# Patient Record
Sex: MALE | Race: WHITE | NOT HISPANIC OR LATINO | Employment: STUDENT | URBAN - NONMETROPOLITAN AREA
[De-identification: names, ages, dates, MRNs, and addresses within clinical notes are randomized per-mention and may not be internally consistent; named-entity substitution may affect disease eponyms.]

---
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
## 2021-04-29 ENCOUNTER — OFFICE VISIT (OUTPATIENT)
Dept: CARDIOLOGY | Facility: CLINIC | Age: 21
End: 2021-04-29

## 2021-04-29 VITALS
HEART RATE: 76 BPM | DIASTOLIC BLOOD PRESSURE: 85 MMHG | SYSTOLIC BLOOD PRESSURE: 144 MMHG | OXYGEN SATURATION: 100 % | BODY MASS INDEX: 28.64 KG/M2 | WEIGHT: 189 LBS | HEIGHT: 68 IN

## 2021-04-29 DIAGNOSIS — R00.0 TACHYCARDIA: ICD-10-CM

## 2021-04-29 DIAGNOSIS — R06.02 SHORTNESS OF BREATH: ICD-10-CM

## 2021-04-29 DIAGNOSIS — R00.2 PALPITATIONS: Primary | ICD-10-CM

## 2021-04-29 PROCEDURE — 99204 OFFICE O/P NEW MOD 45 MIN: CPT | Performed by: PHYSICIAN ASSISTANT

## 2021-04-29 RX ORDER — HYDROCHLOROTHIAZIDE 25 MG/1
25 TABLET ORAL 2 TIMES DAILY
COMMUNITY
End: 2021-05-03

## 2021-04-29 NOTE — PROGRESS NOTES
Subjective   Artem Santoro is a 20 y.o. male     Chief Complaint   Patient presents with   • Establish Care   • Palpitations   • Rapid Heart Rate     mostly at night        HPI  The patient presents back to the clinic today to reestablish care.  He was last seen over 3 years ago in the setting of shortness of air and symptoms otherwise.  Work-up was recommended for which she never had at that time.  He was lost to follow-up and presents today to reestablish care after being evaluated to the emergency room.  The patient was evaluated through the emergency room mainly because of tachycardia and palpitations, but also because of mild chest discomfort and dyspnea as well.  His evaluation at that time including cardiac enzymes, EKG findings, etc. all were benign.  He was discharged for follow-up and advised to follow-up in the clinic today.  The patient reports that most of his symptoms occur at night.  He will have onset of a very fast forceful heartbeat.  This can last varied amounts of times.  He experiences no dizziness or syncope at that time.  He does report increasing dyspnea at that time.  He feels that this is likely most related to anxiety, as symptoms typically occur when he misses his evening dose of hydroxyzine.  Symptoms do appear to be intensifying when he does experience those.  He also notes episodes of chest discomfort at times, typically only when working out and described clearly as chest wall discomfort.  He has no failure symptoms.  He has no further complaints.      Current Outpatient Medications   Medication Sig Dispense Refill   • hydroCHLOROthiazide (HYDRODIURIL) 25 MG tablet Take 25 mg by mouth 2 (two) times a day.       No current facility-administered medications for this visit.       Patient has no known allergies.    Past Medical History:   Diagnosis Date   • Anxiety    • Hypertension    • Nosebleed    • Seizure (CMS/[facility])        Social History     Socioeconomic History   • Marital status:  "Single     Spouse name: Not on file   • Number of children: Not on file   • Years of education: Not on file   • Highest education level: Not on file   Tobacco Use   • Smoking status: Never Smoker   • Smokeless tobacco: Never Used   Substance and Sexual Activity   • Alcohol use: No   • Drug use: No       Family History   Problem Relation Age of Onset   • Hypertension Mother    • Hepatitis Mother    • Diabetes Father    • Hypertension Father    • No Known Problems Brother    • Heart attack Maternal Aunt    • Coronary artery disease Maternal Aunt    • Heart attack Maternal Uncle    • Coronary artery disease Maternal Uncle    • Heart attack Maternal Grandmother    • Coronary artery disease Maternal Grandmother        Review of Systems   Constitutional: Positive for chills (at night ). Negative for fatigue and fever.   HENT: Negative for congestion, rhinorrhea and sneezing.    Eyes: Negative.  Negative for visual disturbance.   Respiratory: Negative for chest tightness, shortness of breath and wheezing.    Cardiovascular: Positive for chest pain and palpitations. Negative for leg swelling.   Gastrointestinal: Negative.    Endocrine: Negative.  Negative for cold intolerance.   Genitourinary: Negative.    Musculoskeletal: Negative.  Negative for arthralgias, back pain and neck pain.   Skin: Negative.  Negative for rash and wound.   Allergic/Immunologic: Negative.  Negative for environmental allergies.   Neurological: Positive for headaches. Negative for dizziness, weakness and numbness.   Hematological: Negative.  Does not bruise/bleed easily.   Psychiatric/Behavioral: Positive for sleep disturbance (falling asleep ).       Objective     Vitals:    04/29/21 1410   BP: 144/85   BP Location: Left arm   Patient Position: Sitting   Pulse: 76   SpO2: 100%   Weight: 85.7 kg (189 lb)   Height: 172.7 cm (68\")        /85 (BP Location: Left arm, Patient Position: Sitting)   Pulse 76   Ht 172.7 cm (68\")   Wt 85.7 kg (189 lb) "   SpO2 100%   BMI 28.74 kg/m²      Lab Results (most recent)     None          Physical Exam  Vitals and nursing note reviewed.   Constitutional:       General: He is not in acute distress.     Appearance: He is well-developed.   HENT:      Head: Normocephalic and atraumatic.   Eyes:      Conjunctiva/sclera: Conjunctivae normal.      Pupils: Pupils are equal, round, and reactive to light.   Neck:      Vascular: No JVD.      Trachea: No tracheal deviation.   Cardiovascular:      Rate and Rhythm: Normal rate and regular rhythm.      Heart sounds: Normal heart sounds.   Pulmonary:      Effort: Pulmonary effort is normal.      Breath sounds: Normal breath sounds.   Abdominal:      General: Bowel sounds are normal. There is no distension.      Palpations: Abdomen is soft. There is no mass.      Tenderness: There is no abdominal tenderness. There is no guarding or rebound.   Musculoskeletal:         General: No tenderness or deformity. Normal range of motion.      Cervical back: Normal range of motion and neck supple.   Skin:     General: Skin is warm and dry.      Coloration: Skin is not pale.      Findings: No erythema or rash.   Neurological:      Mental Status: He is alert and oriented to person, place, and time.   Psychiatric:         Behavior: Behavior normal.         Thought Content: Thought content normal.         Judgment: Judgment normal.         Procedure   Procedures         Assessment/Plan      Diagnosis Plan   1. Palpitations  Adult Transthoracic Echo Complete W/ Cont if Necessary Per Protocol    Holter Monitor - 24 Hour   2. Tachycardia  Adult Transthoracic Echo Complete W/ Cont if Necessary Per Protocol    Holter Monitor - 24 Hour   3. Shortness of breath  Adult Transthoracic Echo Complete W/ Cont if Necessary Per Protocol    Holter Monitor - 24 Hour     1.  I would like to schedule for Holter monitor given recurrent episodes of tachycardia and palpitations.  We can evaluate for dysrhythmic substrates  given current description of symptoms.    2.  I will also schedule for an echocardiogram to evaluate LV size and function, valvular morphologies, etc.    3.  At this time, the patient appears to be on appropriate medications.  I do not feel suppressive therapy is warranted at this time for his palpitations/tachycardia.  The patient feels that symptoms are directly related to anxiety, of note.    4.  I will to see him back after results of the above studies are available.  We will recommend him further at that time.  He will call for any issues prior to follow-up.           Artem Probbs  reports that he has never smoked. He has never used smokeless tobacco.          Patient's Body mass index is 28.74 kg/m². BMI is above normal parameters. Recommendations include: educational material.           Electronically signed by:

## 2021-04-29 NOTE — PATIENT INSTRUCTIONS

## 2021-05-03 ENCOUNTER — TELEPHONE (OUTPATIENT)
Dept: CARDIOLOGY | Facility: CLINIC | Age: 21
End: 2021-05-03

## 2021-05-03 RX ORDER — HYDROXYZINE HYDROCHLORIDE 25 MG/1
25 TABLET, FILM COATED ORAL AS NEEDED
COMMUNITY
End: 2021-09-21

## 2021-05-03 NOTE — TELEPHONE ENCOUNTER
Patient called stating the medication listed is not correct. He is not taking HCTZ he is taking hydroxyzine. Med list updated. Ella Marie MA

## 2021-06-01 ENCOUNTER — TELEPHONE (OUTPATIENT)
Dept: CARDIOLOGY | Facility: CLINIC | Age: 21
End: 2021-06-01

## 2021-06-01 NOTE — TELEPHONE ENCOUNTER
Pt LVM stating to add his mother to release.       I called pt, advised him that he will need to update his verbal form for us to speak w/ his mother regularly.   He stated he's still not doing well and that he'd like to get an rx to slow his heart down. Stated he was at [facility].   I scheduled pt for an appt tomorrow due to continued sx, he is aware that he needs to bring all pill bottles to appt.

## 2021-06-01 NOTE — TELEPHONE ENCOUNTER
"Pt's mother LVM stating pt has been having \"heart issues\" and went to ER, HR was 180. Stated they want holter results and for him to be seen Thursday when he comes in for his testing.   #[phone]        Holter shows max HR of 129, nothing critical or serious was seen.   Openings w/ JR and NB tomorrow.  (NO RELEASE TO SPEAK W/MOTHER!)      First attempt to reach pt. Left a voicemail for pt to return my call at [phone], opt 2.   With return call, please find out what ER pt was seen at and offer him one of the openings tomorrow w/ JR or NB. Thank you!           "

## 2021-06-02 ENCOUNTER — OFFICE VISIT (OUTPATIENT)
Dept: CARDIOLOGY | Facility: CLINIC | Age: 21
End: 2021-06-02

## 2021-06-02 VITALS
DIASTOLIC BLOOD PRESSURE: 92 MMHG | HEART RATE: 90 BPM | HEIGHT: 68 IN | OXYGEN SATURATION: 99 % | BODY MASS INDEX: 27.58 KG/M2 | WEIGHT: 182 LBS | SYSTOLIC BLOOD PRESSURE: 146 MMHG

## 2021-06-02 DIAGNOSIS — I49.49 PREMATURE BEATS: ICD-10-CM

## 2021-06-02 DIAGNOSIS — R00.2 PALPITATIONS: Primary | ICD-10-CM

## 2021-06-02 DIAGNOSIS — R00.0 TACHYCARDIA: ICD-10-CM

## 2021-06-02 PROCEDURE — 99213 OFFICE O/P EST LOW 20 MIN: CPT | Performed by: PHYSICIAN ASSISTANT

## 2021-06-02 RX ORDER — MULTIPLE VITAMINS W/ MINERALS TAB 9MG-400MCG
1 TAB ORAL DAILY
COMMUNITY

## 2021-06-02 NOTE — PROGRESS NOTES
Problem list     Subjective   Artem Santoro is a 20 y.o. male     Chief Complaint   Patient presents with   • Hospital Follow Up Visit     [facility]. Rcds in chart   • Rapid Heart Rate       HPI    Patient is a 20-year-old female who presents to the office for evaluation.  He was seen last office visit in regards to palpitations.  Patient was scheduled for event monitor.  Event monitor demonstrated predominantly sinus rhythm with sinus arrhythmia.  Patient had occasional PACs and PVCs.  Occasional tachycardia    Patient went to the hospital because of fluttering and palpitations.  Work-up there was benign.  He is here today to follow-up.  He experiences these mainly at night.  He would notice a fluttering sensation that occurs.  He does not describe any chest pain or shortness of breath.  His main complaint is this fluttering sensation that occurs.  He does not describe PND orthopnea.    He does not describe any presyncope or syncope..  Otherwise appears stable      Current Outpatient Medications on File Prior to Visit   Medication Sig Dispense Refill   • hydrOXYzine (ATARAX) 25 MG tablet Take 25 mg by mouth As Needed.     • multivitamin with minerals (MULTIVITAMIN ADULT PO) Take 1 tablet by mouth Daily.       No current facility-administered medications on file prior to visit.       Sulfa antibiotics    Past Medical History:   Diagnosis Date   • Anxiety    • Hypertension    • Nosebleed    • Seizure (CMS/HCC)    • Tachycardia        Social History     Socioeconomic History   • Marital status: Single     Spouse name: Not on file   • Number of children: Not on file   • Years of education: Not on file   • Highest education level: Not on file   Tobacco Use   • Smoking status: Never Smoker   • Smokeless tobacco: Never Used   Substance and Sexual Activity   • Alcohol use: No   • Drug use: No       Family History   Problem Relation Age of Onset   • Hypertension Mother    • Hepatitis Mother    • Diabetes Father    •  "Hypertension Father    • No Known Problems Brother    • Heart attack Maternal Aunt    • Coronary artery disease Maternal Aunt    • Heart attack Maternal Uncle    • Coronary artery disease Maternal Uncle    • Heart attack Maternal Grandmother    • Coronary artery disease Maternal Grandmother        Review of Systems   Constitutional: Negative.  Negative for chills, fatigue and fever.   HENT: Negative.  Negative for congestion, sinus pressure and sore throat.    Eyes: Negative.  Negative for visual disturbance.   Respiratory: Negative for chest tightness and shortness of breath.    Cardiovascular: Positive for palpitations (races). Negative for chest pain and leg swelling.   Gastrointestinal: Negative.  Negative for abdominal pain, blood in stool, constipation, diarrhea, nausea and vomiting.   Endocrine: Positive for cold intolerance (when heart rate elevated at times).   Genitourinary: Negative.  Negative for dysuria, frequency, hematuria and urgency.   Musculoskeletal: Positive for back pain (left shoulder comes and goes x one month). Negative for neck pain.   Skin: Negative.  Negative for rash.   Allergic/Immunologic: Negative.  Negative for environmental allergies and food allergies.   Neurological: Positive for light-headedness (with elevated heart rate). Negative for dizziness and syncope.   Hematological: Negative.  Does not bruise/bleed easily.   Psychiatric/Behavioral: Negative.  Negative for sleep disturbance (denies waking with soa or cp).       Objective   Vitals:    06/02/21 0845   BP: 146/92   BP Location: Left arm   Patient Position: Sitting   Pulse: 90   SpO2: 99%   Weight: 82.6 kg (182 lb)   Height: 172.7 cm (68\")      /92 (BP Location: Left arm, Patient Position: Sitting)   Pulse 90   Ht 172.7 cm (68\")   Wt 82.6 kg (182 lb)   SpO2 99%   BMI 27.67 kg/m²     Lab Results (most recent)     None          Physical Exam  Vitals and nursing note reviewed.   Constitutional:       General: He is not " in acute distress.     Appearance: Normal appearance. He is well-developed.   HENT:      Head: Normocephalic and atraumatic.   Eyes:      General: No scleral icterus.        Right eye: No discharge.         Left eye: No discharge.      Conjunctiva/sclera: Conjunctivae normal.   Neck:      Vascular: No carotid bruit.   Cardiovascular:      Rate and Rhythm: Normal rate and regular rhythm.      Heart sounds: Normal heart sounds. No murmur heard.   No friction rub. No gallop.    Pulmonary:      Effort: Pulmonary effort is normal. No respiratory distress.      Breath sounds: Normal breath sounds. No wheezing or rales.   Chest:      Chest wall: No tenderness.   Musculoskeletal:      Right lower leg: No edema.      Left lower leg: No edema.   Skin:     General: Skin is warm and dry.      Coloration: Skin is not pale.      Findings: No erythema or rash.   Neurological:      Mental Status: He is alert and oriented to person, place, and time.      Cranial Nerves: No cranial nerve deficit.   Psychiatric:         Behavior: Behavior normal.         Procedure   Procedures       Assessment/Plan     Problems Addressed this Visit        Cardiac and Vasculature    Premature beats    Relevant Medications    metoprolol tartrate (LOPRESSOR) 25 MG tablet    Tachycardia    Palpitations - Primary      Diagnoses       Codes Comments    Palpitations    -  Primary ICD-10-CM: R00.2  ICD-9-CM: 785.1     Tachycardia     ICD-10-CM: R00.0  ICD-9-CM: 785.0     Premature beats     ICD-10-CM: I49.49  ICD-9-CM: 427.60         Recommendation  1.  Patient with palpitations and tachycardia on event monitor with occasional premature beats.  I would like to start him on low-dose beta-blocker therapy.  We discussed lifestyle modification such as decreasing caffeine consumption etc.  For now, we will start on medication and he is to call in 1 week in regards to symptoms    2.  Otherwise is doing well.  We will see him back for follow-up in approximately 3 to  4 months.  Follow-up with primary as scheduled                  Electronically signed by:

## 2021-06-02 NOTE — PATIENT INSTRUCTIONS
"BMI for Adults  What is BMI?  Body mass index (BMI) is a number that is calculated from a person's weight and height. BMI can help estimate how much of a person's weight is composed of fat. BMI does not measure body fat directly. Rather, it is an alternative to procedures that directly measure body fat, which can be difficult and expensive.  BMI can help identify people who may be at higher risk for certain medical problems.  What are BMI measurements used for?  BMI is used as a screening tool to identify possible weight problems. It helps determine whether a person is obese, overweight, a healthy weight, or underweight.  BMI is useful for:  · Identifying a weight problem that may be related to a medical condition or may increase the risk for medical problems.  · Promoting changes, such as changes in diet and exercise, to help reach a healthy weight. BMI screening can be repeated to see if these changes are working.  How is BMI calculated?  BMI involves measuring your weight in relation to your height. Both height and weight are measured, and the BMI is calculated from those numbers. This can be done either in English ([place] or metric measurements. Note that charts and online BMI calculators are available to help you find your BMI quickly and easily without having to do these calculations yourself.  To calculate your BMI in English ([place] measurements:    1. Measure your weight in pounds (lb).  2. Multiply the number of pounds by 703.  ? For example, for a person who weighs 180 lb, multiply that number by 703, which equals 126,540.  3. Measure your height in inches. Then multiply that number by itself to get a measurement called \"inches squared.\"  ? For example, for a person who is 70 inches tall, the \"inches squared\" measurement is 70 inches x 70 inches, which equals 4,900 inches squared.  4. Divide the total from step 2 (number of lb x 703) by the total from step 3 (inches squared): 126,540 ÷ 4,900 = 25.8. This is " "your BMI.  To calculate your BMI in metric measurements:  1. Measure your weight in kilograms (kg).  2. Measure your height in meters (m). Then multiply that number by itself to get a measurement called \"meters squared.\"  ? For example, for a person who is 1.75 m tall, the \"meters squared\" measurement is 1.75 m x 1.75 m, which is equal to 3.1 meters squared.  3. Divide the number of kilograms (your weight) by the meters squared number. In this example: 70 ÷ 3.1 = 22.6. This is your BMI.  What do the results mean?  BMI charts are used to identify whether you are underweight, normal weight, overweight, or obese. The following guidelines will be used:  · Underweight: BMI less than 18.5.  · Normal weight: BMI between 18.5 and 24.9.  · Overweight: BMI between 25 and 29.9.  · Obese: BMI of 30 or above.  Keep these notes in mind:  · Weight includes both fat and muscle, so someone with a muscular build, such as an athlete, may have a BMI that is higher than 24.9. In cases like these, BMI is not an accurate measure of body fat.  · To determine if excess body fat is the cause of a BMI of 25 or higher, further assessments may need to be done by a health care provider.  · BMI is usually interpreted in the same way for men and women.  Where to find more information  For more information about BMI, including tools to quickly calculate your BMI, go to these websites:  · Centers for Disease Control and Prevention: www.cdc.gov  · American Heart Association: www.heart.org  · National Heart, Lung, and Blood [place]: www.nhlbi.nih.gov  Summary  · Body mass index (BMI) is a number that is calculated from a person's weight and height.  · BMI may help estimate how much of a person's weight is composed of fat. BMI can help identify those who may be at higher risk for certain medical problems.  · BMI can be measured using English measurements or metric measurements.  · BMI charts are used to identify whether you are underweight, normal " weight, overweight, or obese.  This information is not intended to replace advice given to you by your health care provider. Make sure you discuss any questions you have with your health care provider.  Document Revised: 09/09/2020 Document Reviewed: 07/17/2020  Elsevier Patient Education © 2021 [employer]

## 2021-06-03 ENCOUNTER — HOSPITAL ENCOUNTER (OUTPATIENT)
Dept: CARDIOLOGY | Facility: HOSPITAL | Age: 21
Discharge: HOME | End: 2021-06-03
Admitting: PHYSICIAN ASSISTANT

## 2021-06-03 DIAGNOSIS — R00.2 PALPITATIONS: ICD-10-CM

## 2021-06-03 DIAGNOSIS — R06.02 SHORTNESS OF BREATH: ICD-10-CM

## 2021-06-03 DIAGNOSIS — R00.0 TACHYCARDIA: ICD-10-CM

## 2021-06-03 PROCEDURE — 93306 TTE W/DOPPLER COMPLETE: CPT | Performed by: INTERNAL MEDICINE

## 2021-06-03 PROCEDURE — 93306 TTE W/DOPPLER COMPLETE: CPT

## 2021-06-12 LAB
BH CV ECHO MEAS - ACS: 2.5 CM
BH CV ECHO MEAS - AO MAX PG: 8.4 MMHG
BH CV ECHO MEAS - AO MEAN PG: 5 MMHG
BH CV ECHO MEAS - AO ROOT AREA (BSA CORRECTED): 1.6
BH CV ECHO MEAS - AO ROOT AREA: 7.8 CM^2
BH CV ECHO MEAS - AO ROOT DIAM: 3.2 CM
BH CV ECHO MEAS - AO V2 MAX: 145 CM/SEC
BH CV ECHO MEAS - AO V2 MEAN: 104 CM/SEC
BH CV ECHO MEAS - AO V2 VTI: 25.5 CM
BH CV ECHO MEAS - BSA(HAYCOCK): 2 M^2
BH CV ECHO MEAS - BSA: 2 M^2
BH CV ECHO MEAS - BZI_BMI: 27.7 KILOGRAMS/M^2
BH CV ECHO MEAS - BZI_METRIC_HEIGHT: 172.7 CM
BH CV ECHO MEAS - BZI_METRIC_WEIGHT: 82.6 KG
BH CV ECHO MEAS - EDV(CUBED): 49 ML
BH CV ECHO MEAS - EDV(MOD-SP4): 81.2 ML
BH CV ECHO MEAS - EDV(TEICH): 56.6 ML
BH CV ECHO MEAS - EF(CUBED): 46 %
BH CV ECHO MEAS - EF(MOD-SP4): 52 %
BH CV ECHO MEAS - EF(TEICH): 39.2 %
BH CV ECHO MEAS - ESV(CUBED): 26.5 ML
BH CV ECHO MEAS - ESV(MOD-SP4): 39 ML
BH CV ECHO MEAS - ESV(TEICH): 34.4 ML
BH CV ECHO MEAS - FS: 18.6 %
BH CV ECHO MEAS - IVS/LVPW: 0.9
BH CV ECHO MEAS - IVSD: 0.96 CM
BH CV ECHO MEAS - LA DIMENSION: 3.4 CM
BH CV ECHO MEAS - LA/AO: 1.1
BH CV ECHO MEAS - LV DIASTOLIC VOL/BSA (35-75): 41.4 ML/M^2
BH CV ECHO MEAS - LV IVRT: 0.11 SEC
BH CV ECHO MEAS - LV MASS(C)D: 112.1 GRAMS
BH CV ECHO MEAS - LV MASS(C)DI: 57.1 GRAMS/M^2
BH CV ECHO MEAS - LV SYSTOLIC VOL/BSA (12-30): 19.9 ML/M^2
BH CV ECHO MEAS - LVIDD: 3.7 CM
BH CV ECHO MEAS - LVIDS: 3 CM
BH CV ECHO MEAS - LVLD AP4: 7.8 CM
BH CV ECHO MEAS - LVLS AP4: 5.6 CM
BH CV ECHO MEAS - LVOT AREA (M): 3.1 CM^2
BH CV ECHO MEAS - LVOT AREA: 3.1 CM^2
BH CV ECHO MEAS - LVOT DIAM: 2 CM
BH CV ECHO MEAS - LVPWD: 1.1 CM
BH CV ECHO MEAS - MV A MAX VEL: 78 CM/SEC
BH CV ECHO MEAS - MV DEC SLOPE: 231 CM/SEC^2
BH CV ECHO MEAS - MV E MAX VEL: 78.4 CM/SEC
BH CV ECHO MEAS - MV E/A: 1
BH CV ECHO MEAS - RVDD: 3.5 CM
BH CV ECHO MEAS - SI(AO): 101.2 ML/M^2
BH CV ECHO MEAS - SI(CUBED): 11.5 ML/M^2
BH CV ECHO MEAS - SI(MOD-SP4): 21.5 ML/M^2
BH CV ECHO MEAS - SI(TEICH): 11.3 ML/M^2
BH CV ECHO MEAS - SV(AO): 198.7 ML
BH CV ECHO MEAS - SV(CUBED): 22.6 ML
BH CV ECHO MEAS - SV(MOD-SP4): 42.2 ML
BH CV ECHO MEAS - SV(TEICH): 22.2 ML

## 2021-06-14 ENCOUNTER — TELEPHONE (OUTPATIENT)
Dept: CARDIOLOGY | Facility: CLINIC | Age: 21
End: 2021-06-14

## 2021-06-14 NOTE — TELEPHONE ENCOUNTER
Patient informed of echo results and reminded of follow up appointment. Patient verbalized understanding. Gilda Roberts LPN    ----- Message from WICHO Cramer sent at 6/14/2021  9:24 AM EDT -----  Please advise patient.     Adult Transthoracic Echo Complete W/ Cont if Necessary Per Protocol  Order: [id number]  Status:  Final result   Visible to patient:  No (not released) Dx:  Palpitations; Shortness of breath; Ta...  Details    Reading Physician Reading Date Result Priority   Adi Gallegos MD  [phone] 6/3/2021 Routine      Result Text  1.  Normal LV size, function, wall motion, wall thickness.  Usually estimated ejection fraction of 55±5% is corroborated by 3D EF of 57%.  Normal LV diastolic function and filling pressures.  The atria and right ventricle are normal.  No septal defect or intracavitary mass or thrombus.     2.  Valves are morphologically and physiologically normal.     3.  No pericardial or great vessel pathology.     4.  Pulmonary pressures cannot be calculated.

## 2021-06-16 ENCOUNTER — APPOINTMENT (OUTPATIENT)
Dept: CARDIOLOGY | Facility: HOSPITAL | Age: 21
End: 2021-06-16

## 2021-07-07 ENCOUNTER — APPOINTMENT (OUTPATIENT)
Dept: CARDIOLOGY | Facility: HOSPITAL | Age: 21
End: 2021-07-07

## 2021-07-12 DIAGNOSIS — R06.02 SHORTNESS OF BREATH: ICD-10-CM

## 2021-07-12 DIAGNOSIS — R00.2 PALPITATIONS: Primary | ICD-10-CM

## 2021-07-12 DIAGNOSIS — R00.0 TACHYCARDIA: ICD-10-CM

## 2021-07-12 NOTE — PROGRESS NOTES
Patient called office stated he would like to wear heart monitor, stated continuing to have palpitations. Per Kenneth LICEA monitor ordered for two weeks. Gilda Roberts LPN

## 2021-09-21 ENCOUNTER — OFFICE VISIT (OUTPATIENT)
Dept: CARDIOLOGY | Facility: CLINIC | Age: 21
End: 2021-09-21

## 2021-09-21 VITALS
OXYGEN SATURATION: 99 % | BODY MASS INDEX: 26.55 KG/M2 | SYSTOLIC BLOOD PRESSURE: 117 MMHG | DIASTOLIC BLOOD PRESSURE: 75 MMHG | HEART RATE: 63 BPM | HEIGHT: 68 IN | WEIGHT: 175.2 LBS

## 2021-09-21 DIAGNOSIS — R06.02 SHORTNESS OF BREATH: ICD-10-CM

## 2021-09-21 DIAGNOSIS — Z00.00 ROUTINE ADULT HEALTH MAINTENANCE: ICD-10-CM

## 2021-09-21 DIAGNOSIS — R00.2 PALPITATIONS: Primary | ICD-10-CM

## 2021-09-21 DIAGNOSIS — R00.0 TACHYCARDIA: ICD-10-CM

## 2021-09-21 PROCEDURE — 99213 OFFICE O/P EST LOW 20 MIN: CPT | Performed by: PHYSICIAN ASSISTANT

## 2021-09-21 RX ORDER — DIAZEPAM 5 MG/1
5 TABLET ORAL DAILY
COMMUNITY

## 2021-09-21 RX ORDER — SERTRALINE HYDROCHLORIDE 100 MG/1
100 TABLET, FILM COATED ORAL DAILY
COMMUNITY

## 2021-09-21 NOTE — PROGRESS NOTES
Problem list     Subjective   Artem Santoro is a 20 y.o. male     Chief Complaint   Patient presents with   • Follow-up     3 month / testing follow up        HPI  The patient presents back to the clinic today for routine follow-up.  We have followed her because of recurrent episodes of palpitations and tachycardia.  Previous event monitor was largely benign.  Holter indicated some PACs and PVCs.  He had sinus tachycardia which seem to correlate the most with the patient's symptoms.  He eventually was started on metoprolol and followed long-term.  The patient had a previous echo which was benign, of note.  Since starting metoprolol, the patient overall has felt much improved.  Recently, he started noticing increasing palpitations and tachycardia.  He feels that this could be related to hypokalemia which has been an issue for him historically when getting laboratory evaluation.  He has had no dizziness or syncope.  He does have dyspnea.  He has no further complaints.    Current Outpatient Medications on File Prior to Visit   Medication Sig Dispense Refill   • diazePAM (VALIUM) 5 MG tablet Take 5 mg by mouth Daily. 1/2 tablet     • metoprolol tartrate (LOPRESSOR) 25 MG tablet Take 1 tablet by mouth 2 (Two) Times a Day. 60 tablet 11   • multivitamin with minerals (MULTIVITAMIN ADULT PO) Take 1 tablet by mouth Daily.     • sertraline (ZOLOFT) 100 MG tablet Take 100 mg by mouth Daily.     • [DISCONTINUED] hydrOXYzine (ATARAX) 25 MG tablet Take 25 mg by mouth As Needed.       No current facility-administered medications on file prior to visit.       Sulfa antibiotics    Past Medical History:   Diagnosis Date   • Anxiety    • Hypertension    • Nosebleed    • Seizure (CMS/HCC)    • Tachycardia        Social History     Socioeconomic History   • Marital status: Single     Spouse name: Not on file   • Number of children: Not on file   • Years of education: Not on file   • Highest education level: Not on file   Tobacco Use   •  "Smoking status: Never Smoker   • Smokeless tobacco: Never Used   Substance and Sexual Activity   • Alcohol use: No   • Drug use: No       Family History   Problem Relation Age of Onset   • Hypertension Mother    • Hepatitis Mother    • Diabetes Father    • Hypertension Father    • No Known Problems Brother    • Heart attack Maternal Aunt    • Coronary artery disease Maternal Aunt    • Heart attack Maternal Uncle    • Coronary artery disease Maternal Uncle    • Heart attack Maternal Grandmother    • Coronary artery disease Maternal Grandmother        Review of Systems   Constitutional: Negative.  Negative for chills, fatigue and fever.   HENT: Negative.  Negative for congestion, rhinorrhea and sore throat.    Eyes: Negative.  Negative for visual disturbance.   Respiratory: Negative.  Negative for chest tightness, shortness of breath and wheezing.    Cardiovascular: Positive for palpitations. Negative for chest pain and leg swelling.   Gastrointestinal: Negative.    Endocrine: Negative.    Genitourinary: Negative.    Musculoskeletal: Negative for arthralgias, back pain and neck pain.   Skin: Negative.  Negative for rash and wound.   Allergic/Immunologic: Negative.  Negative for environmental allergies.   Neurological: Negative.  Negative for dizziness, weakness, numbness and headaches.   Hematological: Negative.  Does not bruise/bleed easily.   Psychiatric/Behavioral: Positive for sleep disturbance (falling asleep ).       Objective   Vitals:    09/21/21 1416   BP: 117/75   BP Location: Left arm   Patient Position: Sitting   Pulse: 63   SpO2: 99%   Weight: 79.5 kg (175 lb 3.2 oz)   Height: 172.7 cm (68\")      /75 (BP Location: Left arm, Patient Position: Sitting)   Pulse 63   Ht 172.7 cm (68\")   Wt 79.5 kg (175 lb 3.2 oz)   SpO2 99%   BMI 26.64 kg/m²    Lab Results (most recent)     None        Physical Exam  Vitals and nursing note reviewed.   Constitutional:       General: He is not in acute distress.     " Appearance: He is well-developed.   HENT:      Head: Normocephalic and atraumatic.   Eyes:      Conjunctiva/sclera: Conjunctivae normal.      Pupils: Pupils are equal, round, and reactive to light.   Neck:      Vascular: No JVD.      Trachea: No tracheal deviation.   Cardiovascular:      Rate and Rhythm: Normal rate and regular rhythm.      Heart sounds: Normal heart sounds.   Pulmonary:      Effort: Pulmonary effort is normal.      Breath sounds: Normal breath sounds.   Abdominal:      General: Bowel sounds are normal. There is no distension.      Palpations: Abdomen is soft. There is no mass.      Tenderness: There is no abdominal tenderness. There is no guarding or rebound.   Musculoskeletal:         General: No tenderness or deformity. Normal range of motion.      Cervical back: Normal range of motion and neck supple.   Skin:     General: Skin is warm and dry.      Coloration: Skin is not pale.      Findings: No erythema or rash.   Neurological:      Mental Status: He is alert and oriented to person, place, and time.   Psychiatric:         Behavior: Behavior normal.         Thought Content: Thought content normal.         Judgment: Judgment normal.           Procedure   Procedures       Assessment/Plan      Diagnosis Plan   1. Palpitations  CBC & Differential    Comprehensive Metabolic Panel    Lipid Panel    TSH   2. Tachycardia  CBC & Differential    Comprehensive Metabolic Panel    Lipid Panel    TSH   3. Shortness of breath  CBC & Differential    Comprehensive Metabolic Panel    Lipid Panel    TSH   4. Routine adult health maintenance  CBC & Differential    Comprehensive Metabolic Panel    Lipid Panel    TSH     1.  The patient reports recurrent palpitations as of recent.  He feels that this could be related to hypokalemia by his report.  I will schedule for routine laboratories to evaluate further.  As we are drawing laboratories, we will schedule for all his routine laboratories at that time as well.    2.   Mostly, he has felt much improved on metoprolol and anxiolytic medications.  His palpitations/tachycardic symptoms have minimized on that therapy.  I will continue that without change.    3.  If laboratories are unremarkable, I would make no further adjustments.  If symptoms of palpitations or tachycardia minimized, the patient will call to us immediately.  We could always consider antidysrhythmic therapy.  I would hold on that unless symptoms become significantly worse.                   Electronically signed by:

## 2021-09-21 NOTE — PATIENT INSTRUCTIONS

## 2021-10-04 ENCOUNTER — TELEPHONE (OUTPATIENT)
Dept: CARDIOLOGY | Facility: CLINIC | Age: 21
End: 2021-10-04

## 2021-10-04 NOTE — TELEPHONE ENCOUNTER
----- Message from Gilda Roberts LPN sent at 10/4/2021  4:51 PM EDT -----    ----- Message -----  From: aJron Dotson PA  Sent: 10/4/2021   3:26 PM EDT  To: Gilda Roberts LPN    Labs are okay

## 2022-07-14 ENCOUNTER — TELEPHONE (OUTPATIENT)
Dept: CARDIOLOGY | Facility: CLINIC | Age: 22
End: 2022-07-14

## 2022-07-14 DIAGNOSIS — I49.49 PREMATURE BEATS: Primary | ICD-10-CM

## 2022-07-14 DIAGNOSIS — R00.0 TACHYCARDIA: ICD-10-CM

## 2022-07-14 DIAGNOSIS — R00.2 PALPITATIONS: ICD-10-CM

## 2023-06-12 DIAGNOSIS — R00.0 TACHYCARDIA: ICD-10-CM

## 2023-06-12 DIAGNOSIS — I49.49 PREMATURE BEATS: ICD-10-CM

## 2023-06-12 DIAGNOSIS — R00.2 PALPITATIONS: ICD-10-CM

## 2023-10-10 DIAGNOSIS — R00.0 TACHYCARDIA: ICD-10-CM

## 2023-10-10 DIAGNOSIS — R00.2 PALPITATIONS: ICD-10-CM

## 2023-10-10 DIAGNOSIS — I49.49 PREMATURE BEATS: ICD-10-CM

## 2023-11-09 DIAGNOSIS — R00.0 TACHYCARDIA: ICD-10-CM

## 2023-11-09 DIAGNOSIS — R00.2 PALPITATIONS: ICD-10-CM

## 2023-11-09 DIAGNOSIS — I49.49 PREMATURE BEATS: ICD-10-CM

## 2024-03-29 DIAGNOSIS — R00.0 TACHYCARDIA: ICD-10-CM

## 2024-03-29 DIAGNOSIS — I49.49 PREMATURE BEATS: ICD-10-CM

## 2024-03-29 DIAGNOSIS — R00.2 PALPITATIONS: ICD-10-CM

## 2024-04-01 DIAGNOSIS — I49.49 PREMATURE BEATS: ICD-10-CM

## 2024-04-01 DIAGNOSIS — R00.2 PALPITATIONS: ICD-10-CM

## 2024-04-01 DIAGNOSIS — R00.0 TACHYCARDIA: ICD-10-CM

## 2024-04-01 NOTE — TELEPHONE ENCOUNTER
Caller: Artem Santoro    Relationship: Self    Best call back number: [phone]    Requested Prescriptions:   Requested Prescriptions     Pending Prescriptions Disp Refills    metoprolol tartrate (LOPRESSOR) 25 MG tablet 60 tablet 0     Sig: Take 1 tablet by mouth 2 (Two) Times a Day.        Pharmacy where request should be sent: [id number] - [facility], [place] - [address] AT [address] [facility] [address] - [phone]  - [phone] FX     Last office visit with prescribing clinician: Visit date not found   Last telemedicine visit with prescribing clinician: Visit date not found   Next office visit with prescribing clinician: Visit date not found     Additional details provided by patient: PATIENT HAS 1 PILL    Does the patient have less than a 3 day supply:  [x] Yes  [] No    Would you like a call back once the refill request has been completed: [] Yes [x] No    If the office needs to give you a call back, can they leave a voicemail: [] Yes [x] No    Anika Alves Rep   04/01/24 10:37 EDT

## 2024-04-04 DIAGNOSIS — R00.0 TACHYCARDIA: ICD-10-CM

## 2024-04-04 DIAGNOSIS — I49.49 PREMATURE BEATS: ICD-10-CM

## 2024-04-04 DIAGNOSIS — R00.2 PALPITATIONS: ICD-10-CM

## 2024-04-04 NOTE — TELEPHONE ENCOUNTER
Pt called stating he needs refills but hasn't been seen in a few years. I advised pt that I could Rf until seen, but must keep appt.

## 2024-05-07 ENCOUNTER — OFFICE VISIT (OUTPATIENT)
Dept: CARDIOLOGY | Facility: CLINIC | Age: 24
End: 2024-05-07
Payer: COMMERCIAL

## 2024-05-07 VITALS
HEIGHT: 68 IN | BODY MASS INDEX: 30.95 KG/M2 | DIASTOLIC BLOOD PRESSURE: 88 MMHG | RESPIRATION RATE: 18 BRPM | HEART RATE: 77 BPM | WEIGHT: 204.2 LBS | OXYGEN SATURATION: 98 % | SYSTOLIC BLOOD PRESSURE: 144 MMHG

## 2024-05-07 DIAGNOSIS — R00.2 PALPITATIONS: Primary | ICD-10-CM

## 2024-05-07 DIAGNOSIS — R00.0 TACHYCARDIA: ICD-10-CM

## 2024-05-07 PROCEDURE — 93000 ELECTROCARDIOGRAM COMPLETE: CPT | Performed by: CLINICAL NURSE SPECIALIST

## 2024-05-07 PROCEDURE — 99213 OFFICE O/P EST LOW 20 MIN: CPT | Performed by: CLINICAL NURSE SPECIALIST

## 2024-05-10 ENCOUNTER — TELEPHONE (OUTPATIENT)
Dept: CARDIOLOGY | Facility: CLINIC | Age: 24
End: 2024-05-10
Payer: COMMERCIAL

## 2024-05-10 DIAGNOSIS — E78.5 HYPERLIPIDEMIA LDL GOAL <100: Primary | ICD-10-CM

## 2024-05-10 NOTE — TELEPHONE ENCOUNTER
Brigette Martin, WICHO Silva, Azeb  Labs ok except .  Recommend diet modifications to reduce cholesterol and increasing exercise.  We can give him diet education at his follow up.  Recheck lipid panel in 6 months

## 2024-05-10 NOTE — TELEPHONE ENCOUNTER
First attempt to reach pt. Left a voicemail for pt to return my call at [phone].       RELAY      Labs looked good other than elevated LDL cholesterol. Try to reduce cholesterol in diet and increase exercise. We will recheck labs in 6 months.

## 2024-11-27 ENCOUNTER — TELEPHONE (OUTPATIENT)
Dept: CARDIOLOGY | Facility: CLINIC | Age: 24
End: 2024-11-27
Payer: COMMERCIAL

## 2024-11-27 NOTE — TELEPHONE ENCOUNTER
Name: Artem Santoro      Relationship: Self      Best Callback Number: [phone]       HUB PROVIDED THE RELAY MESSAGE FROM THE OFFICE      PATIENT: HAS FURTHER QUESTIONS AND WOULD LIKE A CALL BACK AT THE FOLLOWING PHONE NUMBER     ADDITIONAL INFORMATION: CAN YOU FAX ORDERS TO [facility] IN [place], [place]?

## 2025-01-15 ENCOUNTER — TELEPHONE (OUTPATIENT)
Dept: CARDIOLOGY | Facility: CLINIC | Age: 25
End: 2025-01-15
Payer: COMMERCIAL

## 2025-01-15 NOTE — TELEPHONE ENCOUNTER
Working on overdue results, pt is past due for labs as we have not received results or it has not been performed.     Per chart review, pt was contacted regd this overdue result previously and stated he would get labs w/ Dr. Carrillo's office.   I called Dr. Carrillo's office and they stated pt hasn't had labs w/ them since .       Called pt, he stated he hasn't done his labs as he hasn't had time. I explained we do need those results as soon as he's able as the order is from May 2024. Pt stated he would try when he has time. Pt is aware the order will  soon.

## 2025-07-13 DIAGNOSIS — R00.0 TACHYCARDIA: ICD-10-CM

## 2025-07-13 DIAGNOSIS — I49.49 PREMATURE BEATS: ICD-10-CM

## 2025-07-13 DIAGNOSIS — R00.2 PALPITATIONS: ICD-10-CM

## 2025-07-14 RX ORDER — METOPROLOL TARTRATE 25 MG/1
25 TABLET, FILM COATED ORAL 2 TIMES DAILY
Qty: 180 TABLET | Refills: 3 | Status: SHIPPED | OUTPATIENT
Start: 2025-07-14

## 2025-08-28 ENCOUNTER — OFFICE VISIT (OUTPATIENT)
Dept: CARDIOLOGY | Facility: CLINIC | Age: 25
End: 2025-08-28
Payer: MEDICAID

## 2025-08-28 VITALS
OXYGEN SATURATION: 99 % | DIASTOLIC BLOOD PRESSURE: 71 MMHG | HEART RATE: 69 BPM | WEIGHT: 185.8 LBS | HEIGHT: 68 IN | BODY MASS INDEX: 28.16 KG/M2 | SYSTOLIC BLOOD PRESSURE: 127 MMHG

## 2025-08-28 DIAGNOSIS — R00.2 PALPITATIONS: ICD-10-CM

## 2025-08-28 DIAGNOSIS — R00.0 TACHYCARDIA: ICD-10-CM

## 2025-08-28 PROCEDURE — 99213 OFFICE O/P EST LOW 20 MIN: CPT | Performed by: CLINICAL NURSE SPECIALIST

## 2025-08-28 RX ORDER — CREATINE 100 %
POWDER (GRAM) MISCELLANEOUS
COMMUNITY

## 2025-08-28 RX ORDER — METOPROLOL TARTRATE 25 MG/1
25 TABLET, FILM COATED ORAL 2 TIMES DAILY
Qty: 180 TABLET | Refills: 3 | Status: SHIPPED | OUTPATIENT
Start: 2025-08-28